# Patient Record
Sex: FEMALE | Race: WHITE | Employment: OTHER | URBAN - METROPOLITAN AREA
[De-identification: names, ages, dates, MRNs, and addresses within clinical notes are randomized per-mention and may not be internally consistent; named-entity substitution may affect disease eponyms.]

---

## 2017-10-16 ENCOUNTER — HOSPITAL ENCOUNTER (OUTPATIENT)
Dept: VASCULAR SURGERY | Age: 77
Discharge: HOME | End: 2017-10-16
Attending: FAMILY MEDICINE
Payer: MEDICARE

## 2017-10-16 DIAGNOSIS — M79.89 SWOLLEN LEG: ICD-10-CM

## 2017-10-16 PROCEDURE — 93971 EXTREMITY STUDY: CPT

## 2017-10-16 NOTE — PROCEDURES
[facility]  *** FINAL REPORT ***    Name: Tyron Monahan  MRN: [id number]    Outpatient  : 07 May 1940  HIS Order #: [id number]  [id number] [facility] [place] [facility] Visit #: [id number]  Date: 16 Oct 2017    TYPE OF TEST: Peripheral Venous Testing    REASON FOR TEST  Limb swelling    Right Leg:-  Deep venous thrombosis:           No  Superficial venous thrombosis:    No  Deep venous insufficiency:        Not examined  Superficial venous insufficiency: Yes      INTERPRETATION/FINDINGS  Duplex images were obtained using 2-D gray scale, color flow, and  spectral Doppler analysis. Right leg :  1. Deep vein(s) visualized include the common femoral, deep femoral,  proximal femoral, mid femoral, distal femoral, popliteal(above knee),  popliteal(fossa), popliteal(below knee), posterior tibial and peroneal   veins. 2. No evidence of deep venous thrombosis detected in the veins  visualized. 3. No evidence of deep vein thrombosis in the contralateral common  femoral vein. 4. Superficial vein(s) visualized include the great saphenous vein. 5. No evidence of superficial thrombosis detected. 6. Venous reflux noted in the great saphenous vein. ADDITIONAL COMMENTS    I have personally reviewed the data relevant to the interpretation of  this  study.     TECHNOLOGIST: Elías Morrison  Signed: 10/16/2017 04:43 PM    PHYSICIAN: Nader Moctezuma MD  Signed: 10/17/2017 04:01 PM

## 2018-11-14 PROBLEM — N13.2 URETERAL STONE WITH HYDRONEPHROSIS: Status: ACTIVE | Noted: 2018-11-14

## 2018-11-26 PROBLEM — R25.2 LEG CRAMPING: Status: ACTIVE | Noted: 2017-10-24

## 2018-11-26 PROBLEM — N20.0 NEPHROLITHIASIS: Status: ACTIVE | Noted: 2018-09-28

## 2018-11-26 PROBLEM — R35.0 URINARY FREQUENCY: Status: ACTIVE | Noted: 2017-10-24

## 2018-11-26 PROBLEM — Z87.39 H/O BURNING PAIN IN LEG: Status: ACTIVE | Noted: 2017-10-24

## 2018-11-26 PROBLEM — N13.9 OBSTRUCTIVE UROPATHY: Status: ACTIVE | Noted: 2018-09-28

## 2018-11-26 PROBLEM — I83.90 VARICOSE VEIN OF LEG: Status: ACTIVE | Noted: 2017-10-24

## 2018-11-26 PROBLEM — G47.52 REM SLEEP BEHAVIOR DISORDER: Status: ACTIVE | Noted: 2017-09-18

## 2018-11-26 PROBLEM — E53.8 VITAMIN B12 DEFICIENCY: Status: ACTIVE | Noted: 2017-10-24

## 2018-11-26 PROBLEM — Z91.81 AT HIGH RISK FOR FALLS: Status: ACTIVE | Noted: 2018-01-08
